# Patient Record
Sex: MALE | Race: WHITE | URBAN - METROPOLITAN AREA
[De-identification: names, ages, dates, MRNs, and addresses within clinical notes are randomized per-mention and may not be internally consistent; named-entity substitution may affect disease eponyms.]

---

## 2020-10-31 ENCOUNTER — HISTORICAL (OUTPATIENT)
Dept: ADMINISTRATIVE | Facility: HOSPITAL | Age: 75
End: 2020-10-31

## 2020-12-15 ENCOUNTER — HISTORICAL (OUTPATIENT)
Dept: ADMINISTRATIVE | Facility: HOSPITAL | Age: 75
End: 2020-12-15

## 2021-03-16 ENCOUNTER — HISTORICAL (OUTPATIENT)
Dept: LAB | Facility: HOSPITAL | Age: 76
End: 2021-03-16

## 2022-04-07 ENCOUNTER — HISTORICAL (OUTPATIENT)
Dept: ADMINISTRATIVE | Facility: HOSPITAL | Age: 77
End: 2022-04-07

## 2022-04-23 VITALS
OXYGEN SATURATION: 97 % | HEIGHT: 68 IN | DIASTOLIC BLOOD PRESSURE: 82 MMHG | SYSTOLIC BLOOD PRESSURE: 146 MMHG | BODY MASS INDEX: 27.74 KG/M2 | WEIGHT: 183 LBS

## 2022-04-30 NOTE — DISCHARGE SUMMARY
Patient:   Rei Garsia            MRN: [id number]            FIN: [id number]               Age:   75 years     Sex:  Male     :  1945   Associated Diagnoses:   None   Author:   Matthew Carrion MD      Basic Information     Prior history/Hospital course  75-year-old male presents to the ER with knee pain and swelling.  Patient says he has a history of bilateral knee pain arthritis, seen by Dr. Cortés about a month ago and was scheduled for a left knee replacement, at that time he had a right knee injection with what sounds like a steroid.  Son is giving most of the history says he developed soreness in that knee about a week after the injection which was fairly mild.  Patient subsequently had his left knee surgery canceled because of elevated glucose and was sent to his PCP Dr. Aguirre who has been adjusting his diabetic medications.  Last week Thursday and Friday patient worked out in the yard fairly vigorously and developed increasing pain and swelling in the knee, [facility] and was given tramadol and knee immobilizer and sent home.  Pain and swelling continued to increase to the point where he was unable to walk or bend his leg.  He presented to the hospital at  orthopedic, ER doctor attempted to aspirate but was unable to obtain any fluid.  Patient is being admitted with septic joint and orthopedic surgeon planning I&D and washout this evening.  Patient underwent arthroscopic knee surgery 10/19 with debridement and washout, IV antibiotics vancomycin.  Patient had persistent positive blood cultures 10/19, 10/20, 10/22 with MRSA. Repeat blood cultures 10/25 negative, PICC line placed for long-term vancomycin ×4 weeks and patient discharged to the [facility] 10/28/2020. His time at [facility] has been uneventful. He was seen by ortho on  for increased knee pain. It improved with a course of mobic. He will complete his course of IV vanc at home on  11/30/20    Subjective  The patient is in his wheelchair, working in the gym. He has no c/o. DC plan d/w patient.      Review of Systems   Except as documented, all other systems reviewed and negative.      Health Status   Allergies:    Allergic Reactions (Selected)  No Known Allergies,    Allergies (1) Active Reaction  No Known Allergies None Documented     Current medications:    Medications (34) Active  Scheduled: (15)  aspirin 81 mg Chew tab  81 mg 1 tab(s), Oral, Daily  atorvastatin 40 mg Tab  40 mg 1 tab(s), Oral, qPM  enoxaparin 40mg/0.4ml Inj  40 mg 0.4 mL, Subcutaneous, Daily  gabapentin 300 mg Cap UD  300 mg 1 cap(s), Oral, At Bedtime  hydrochlorothiazide 25 mg Tab UD  12.5 mg 0.5 tab(s), Oral, qAM  labetalol 200 mg Tab UD  400 mg 2 tab(s), Oral, BID  lisinopril 20 mg Tab UD ([facility])  40 mg 2 tab(s), Oral, Daily  metformin 500 mg Tab UD  500 mg 1 tab(s), Oral, BID  NIFEdipine 30 mg CR  UD  90 mg 3 tab(s), Oral, Daily  pantoprazole 20 mg Oral EC Tab  20 mg 1 tab(s), Oral, Daily  polyethylene glycol (Miralax 17 gm pkt)  17 gm 1 packet(s), Oral, Daily  sodium chloride 0.9% Inj-10ml  10 mL, IV Push, q12hr  tamsulosin 0.4 mg Cap  0.4 mg 1 cap(s), Oral, BID  traMADOL 50 mg Tab UD  50 mg 1 tab(s), Oral, q6hr  vancomycin + vancomycin  1,250 mg, IV Piggyback, q12hr  Continuous: (1)  sodium chloride 0.9% 500 mL  500 mL, IV, 30 mL/hr  PRN: (18)  acetaminophen 325 mg Tab  325 mg 1 tab(s), Oral, q4hr  acetaminophen-oxyCODONE 325 mg-10 mg Tab UD  1 tab(s), Oral, q4hr  benzonatate 100 mg Cap  200 mg 2 cap(s), Oral, q8hr  bisacodyl 10 mg Sup  10 mg 1 supp, OH, Daily  dextrose 50% vial 50 ml  12.5 gm 25 mL, IV Push, Once  dextrose 50% vial 50 ml  12.5 gm 25 mL, IV Push, As Directed  dextrose 50% vial 50 ml  25 gm 50 mL, IV Push, As Directed  docusate sodium 100 mg Cap UD  100 mg 1 cap(s), Oral, BID  glucagon recombinant 1 mg Inj  1 mg 1 EA, IM, q10min  glucagon recombinant 1 mg Inj  1 mg 1 EA, IM, q10min  hydrALAzine 20  mg/ml Inj- 1 mL  20 mg 1 mL, IV, q3hr  insulin (Humalog) lispro 100 u/ml Inj  2-14 units, Subcutaneous, As Directed  labetalol 5 mg/mL 20mL vial  20 mg 4 mL, IV, q4hr  magnesium hydroxide 8% Sunshine (MOM) UD  30 mL, Oral, Daily  methocarbamol 500 mg Tab  750 mg 1.5 tab(s), Oral, q8hr  ondansetron 2 mg/mL inj - 2mL  4 mg 2 mL, IV, q4hr  oxycodone 10 mg ER Tab UD  10 mg 1 tab(s), Oral, q4hr  temazepam 15 mg Cap UD  15 mg 1 cap(s), Oral, At Bedtime        Physical Examination      Vital Signs (last 24 hrs)_____  Last Charted___________  Temp Oral     36.6 DegC  (NOV 23 08:00)  Heart Rate Peripheral   79 bpm  (NOV 23 08:00)  Resp Rate         18 br/min  (NOV 23 08:00)  SBP      135 mmHg  (NOV 23 08:00)  DBP      75 mmHg  (NOV 23 08:00)  SpO2      96 %  (NOV 23 08:00)     General:  Alert and oriented, No acute distress.    HENT:  Normocephalic, Oral mucosa is moist.    Respiratory:  Lungs are clear to auscultation, Respirations are non-labored, Breath sounds are equal, Symmetrical chest wall expansion.    Cardiovascular:  Normal rate, Regular rhythm, PICC line in place.    Musculoskeletal:  Right knee mild swelling, no warmth, reddness or tenderness.    Integumentary:  Warm, Dry, Intact, Pressure related injury   No qualifying data avaialble..    Neurologic:  Alert, Oriented, No focal deficits.    Psychiatric:  Cooperative, Appropriate mood & affect, Normal judgment.       Review / Management   Laboratory Results   Today's Lab Results : PowerNote Discrete Results   11/23/2020 3:40 CST      Vanco Tr                  17.6 ug/ml  HI    11/22/2020 9:00 CST      Blood Glucose Testing Reason              Routine                             Blood Glucose Interventions               Administered agent to decrease blood sugar                             Blood Glucose, POC        200 mg/dL  HI        Documentation reviewed:       Calls & consults placed: dvt prophylaxis.       Impression and Plan   Septic right knee joint/arthroscopic  surgery washout 10/19  MRSA Bacteremia  DM 2 a1c 7.2  Hypertension  History of peptic ulcer disease  BPH  Protein calorie malnutrition  PAB-10  Anemia    Plan  dc home today  Pt completes iv vancomycin on 11/30  f/u with ortho Dr. Cortés  f/u with ID Dr. DILLON  f/u with pcp Dr. Aguirre     DC time >30 mins

## 2022-04-30 NOTE — H&P
Patient:   Rei Garsia            MRN: [id number]            FIN: [id number]               Age:   75 years     Sex:  Male     :  1945   Associated Diagnoses:   None   Author:   Larry Oswald MD      Basic Information   Source of history:  Self.       Chief Complaint      History of Present Illness   Previous history  75-year-old male presents to the ER with knee pain and swelling.  Patient says he has a history of bilateral knee pain arthritis, seen by Dr. Cortés about a month ago and was scheduled for a left knee replacement, at that time he had a right knee injection with what sounds like a steroid.  Son is giving most of the history says he developed soreness in that knee about a week after the injection which was fairly mild.  Patient subsequently had his left knee surgery canceled because of elevated glucose and was sent to his PCP Dr. Saucedo who has been adjusting his diabetic medications.  Last week Thursday and Friday patient worked out in the yard fairly vigorously and developed increasing pain and swelling in the knee, he presented to [facility] Saturday and was given tramadol and knee immobilizer and sent home.  Pain and swelling continued to increase to the point where he was unable to walk or bend his leg.  He presented to the hospital at  orthopedic, ER doctor attempted to aspirate but was unable to obtain any fluid.  Patient is being admitted with septic joint and orthopedic surgeon planning I&D and washout this evening.  Patient denies any fever or chills, he does admit to decreased appetite and decreased energy level and decreasing range of motion to the right knee which is exquisitely tender and swollen.  Patient denies any cardiac history, no chest pain or shortness of breath,  active lifestyle.  No abdominal or urinary complaints.  Patient underwent arthroscopic knee surgery 10/19 with debridement and washout, IV antibiotics vancomycin.  Patient had persistent  positive blood cultures 10/19, 10/20, 10/22.  Repeat blood cultures 10/25 negative, PICC line placed today for long-term vancomycin ×4 weeks and patient is ready for discharge to the LTAC today.  Patient moved upstairs to the LTAC, complaining of knee pain, has not had any pain medicine since this morning.          Review of Systems   Constitutional:  No fever, No chills.    Eye:  No icterus, No blurring.    Ear/Nose/Mouth/Throat:  No decreased hearing, No nasal congestion.    Respiratory:  No shortness of breath, No cough.    Cardiovascular:  No chest pain, No palpitations.    Gastrointestinal:  No nausea, No vomiting.    Genitourinary:  No dysuria, No hematuria.    Hematology/Lymphatics:  No bruising tendency, No bleeding tendency.    Endocrine:  No excessive thirst, No polyuria.    Immunologic:  Not immunocompromised, No recurrent fevers.    Musculoskeletal:  Negative except as documented in history of present illness, No back pain, No neck pain.    Integumentary:  No rash, No pruritus.    Neurologic:  Alert and oriented X4, No abnormal balance.    Psychiatric:  No anxiety, No depression.       Health Status   Allergies:    Allergies (1) Active Reaction  No Known Allergies None Documented     Current medications:  (Selected)   Inpatient Medications  Ordered  Colace 100 mg oral capsule: 100 mg, form: Cap, Oral, BID PRN for constipation, first dose 10/19/20 15:57:00 CDT  Dextrose 50% and Water  (50 mL vial/syringe): 25 mL, 12.5 gm =, form: Injection, IV Push, As Directed PRN for blood glucose, Infuse over: 5 minute(s), first dose 10/19/20 15:48:00 CDT, Unconscious patient: Repeat as ordered per protocol.  Dextrose 50% and Water  (50 mL vial/syringe): 25 mL, 12.5 gm =, form: Injection, IV Push, Once PRN for blood glucose, Infuse over: 5 minute(s), first dose 10/19/20 15:48:00 CDT, Unconscious patient: Look for other source of altered mental status.  Dextrose 50% and Water  (50 mL vial/syringe): 50 mL, 25 gm =, form:  Injection, IV Push, As Directed, Infuse over: 5 minute(s), first dose 10/19/20 15:48:00 CDT, Unconscious patient: Repeat as ordered per protocol.  Dextrose 50% in Water intravenous solution: 25 mL, 12.5 gm =, form: Injection, IV Push, As Directed PRN for blood glucose, Infuse over: 5 minute(s), first dose 10/19/20 15:48:00 CDT, Conscious patient.  Dulcolax Laxative 10 mg RECTAL suppository: 10 mg, form: Supp, RI, Daily PRN for constipation, first dose 10/19/20 18:57:00 CDT, give in unrelieved with MOM and Miralax  Flomax 0.4 mg oral capsule: 0.4 mg, form: Cap, Oral, BID, first dose 10/26/20 21:00:00 CDT  Lipitor: 80 mg, form: Tab, Oral, Daily, first dose 10/19/20 17:00:00 CDT  Lovenox: 40 mg, form: Injection, Subcutaneous, Daily, first dose 10/20/20 12:12:00 CDT  Milk of Magnesia: 30 mL, form: Susp, Oral, Daily PRN for constipation, first dose 10/19/20 18:57:00 CDT  MiraLax (polyethylene glycol 3350): 17 gm, form: Powder-Recon, Oral, Daily, first dose 10/20/20 9:00:00 CDT  NIFEdipine 90 mg oral tablet, extended release: 90 mg, form: Tab-CR, Oral, Daily, first dose 10/20/20 9:00:00 CDT  NS (0.9% Sodium Chloride) Infusion: 1,000 mL, 1,000 mL, IV, Now, 1000 mL/hr, start date 10/19/20 13:04:00 CDT, stop date 10/19/20 13:04:00 CDT, STAT  Neurontin 300 mg oral capsule: 300 mg, form: Cap, Oral, At Bedtime, first dose 10/20/20 21:00:00 CDT  Pepcid 20 mg oral tablet: 20 mg, form: Tab, Oral, Daily, first dose 10/20/20 9:00:00 CDT  Percocet 5/325: 1 tab(s), form: Tab, Oral, q6hr PRN for pain, first dose 10/23/20 14:00:00 CDT, pain score 6-10  Restoril 15 mg oral capsule: 15 mg, form: Cap, Oral, At Bedtime PRN for insomnia, first dose 10/19/20 18:57:00 CDT  Robaxin 750 mg oral tablet: 750 mg, form: Tab, Oral, q8hr PRN for spasm, first dose 10/19/20 18:57:00 CDT  Senokot S 50 mg-8.6 mg oral tablet: 2 tab(s), form: Tab, Oral, BID, first dose 10/19/20 21:00:00 CDT  Sodium Chloride 0.9% PF vial (For PICC Flush): 10 mL, form:  Injection, IV Push, As Directed PRN for other (see comment), first dose 10/28/20 10:10:00 CDT, prior to administering IV meds and/or blood sampling  Sodium Chloride 0.9% PF vial (For PICC Flush): 10 mL, form: Injection, IV Push, q12hr, first dose 10/28/20 11:00:00 CDT, Flush each lumen following hospital policy for other flushing guidelines  Sodium Chloride 0.9% PF vial (For PICC Flush): 20 mL, form: Injection, IV Push, As Directed PRN for other (see comment), first dose 10/28/20 10:10:00 CDT, following administrations of IV medications and/or blood sampling  Tessalon Perles: 200 mg, form: Cap, Oral, q8hr PRN for cough, first dose 10/19/20 15:57:00 CDT  Toradol: 30 mg, form: Injection, IV Push, Once, first dose 01/09/13 8:00:00 CST, stop date 01/09/13 8:00:00 CST, in recovery room  Tradjenta: 5 mg, form: Tab, Oral, Daily, first dose 10/20/20 9:00:00 CDT  Tylenol 325 mg oral tablet: 325 mg, form: Tab, Oral, q4hr PRN for fever, first dose 10/19/20 15:57:00 CDT, > 38°C (100.4°F)  Ultram: 50 mg, form: Tab, Oral, q4hr PRN for as needed for pain, first dose 10/19/20 18:57:00 CDT, Pain Score 1-5  Vasotec 1.25 mg/mL intravenous solution: 2.5 mg, form: Soln, IV, q6hr PRN for hypertension, first dose 10/19/20 15:57:00 CDT, SBP>160  Zofran ODT: 4 mg, form: Tab-Dis, Oral, q6hr PRN for nausea/vomiting, first dose 10/19/20 19:57:00 CDT, once tolerating PO intake  Zofran: 4 mg, form: Injection, IV Push, q6hr PRN for nausea/vomiting, first dose 10/19/20 18:57:00 CDT, If unable to tolerate PO  ascorbic acid: 500 mg, form: Tab, Oral, At Bedtime, first dose 10/19/20 21:00:00 CDT  aspirin 81 mg oral Delayed Release (EC) tablet: 81 mg, form: Tab-EC, Oral, Daily, first dose 10/20/20 9:00:00 CDT  diphenhydrAMINE  IV Push: 25 mg, form: Injection, IV, q4hr PRN for itching, first dose 10/19/20 15:57:00 CDT  glimepiride: 2 mg, form: Tab, Oral, BID, first dose 10/19/20 21:00:00 CDT  glucagon: 1 mg, form: Injection, IM, q10min PRN for blood  glucose, first dose 10/19/20 15:48:00 CDT, Conscious Patient with NO IV access available and BG < 45 mg/dl.  glucagon: 1 mg, form: Injection, IM, q10min PRN for blood glucose, first dose 10/19/20 15:48:00 CDT, Unconscious patient: Patient with NO IV access available and BG < 70 mg/dl.  Place IV access; move to protocol above.  hydrALAZINE (Apresoline) Inj.: 10 mg, form: Injection, IV Push, q4hr PRN for hypertension, first dose 10/19/20 18:57:00 CDT, SBP >160, DBP >90  hydrALAZINE 20 mg/mL injectable solution: 20 mg, form: Injection, IV, q3hr PRN for hypertension, first dose 10/19/20 15:57:00 CDT, SBP>160; choose only if unrelieved by labetalol or choose first if ordered alone for hypertension  hydrochlorothiazide: 12.5 mg, form: Tab, Oral, qAM, first dose 10/21/20 6:00:00 CDT  insulin isophane-insulin regular human recombinant 70 units-30 units/mL subcutaneous injection: 13 units, form: Injection, Subcutaneous, BIDAC, first dose 10/24/20 16:30:00 CDT  insulin lispro: 3-21 units, form: Soln, Subcutaneous, As Directed PRN for blood glucose, first dose 10/19/20 15:48:00 CDT  labetalol 200 mg oral tablet: 400 mg, form: Tab, Oral, BID, first dose 10/26/20 21:00:00 CDT  labetalol 5 mg/mL intravenous solution: 20 mg, form: Soln, IV, q4hr PRN for hypertension, first dose 10/19/20 15:57:00 CDT, SBP>160; choose as initial treatment  lisinopril: 40 mg, form: Tab, Oral, Daily, first dose 10/23/20 9:00:00 CDT  metformin 500 mg oral tablet: 1,000 mg, form: Tab, Oral, BID, first dose 10/20/20 21:00:00 CDT  pantoprazole: 40 mg, form: Tab-EC, Oral, Daily, first dose 10/21/20 6:00:00 CDT  vancomycin + Sodium Chloride 0.9% 250ml 250 mL: 1,250 mg, form: Injection, IV Piggyback, q12hr, Infuse over: 2 hr, first dose 10/23/20 12:00:00 CDT  vancomycin: 500 mg, form: Injection, IV Piggyback, Once, Infuse over: 0.8 hr, first dose 10/19/20 17:00:00 CDT, stop date 10/19/20 17:00:00 CDT  Documented Medications  Documented  Colace 100 mg oral  capsule: 100 mg = 1 cap(s), Oral, BID, PRN PRN constipation, 0 Refill(s)  Dulcolax Laxative 10 mg RECTAL suppository: 10 mg = 1 supp, ID, Daily, PRN PRN constipation, 0 Refill(s)  Flomax 0.4 mg oral capsule: 0.4 mg = 1 cap(s), Oral, BID, 0 Refill(s)  Lovenox: 40 mg = 0.4 mL, Subcutaneous, Daily, 0 Refill(s)  MiraLax: 0 Refill(s)  NIFEdipine 60 mg oral tablet, extended release: 90 mg = 1.5 tab(s), Oral, Daily  Neurontin 300 mg oral capsule: 300 mg = 1 cap(s), Oral, At Bedtime, 0 Refill(s)  Percocet 5/325: 1 tab(s), Oral, q6hr, PRN PRN pain, 0 Refill(s)  Restoril 15 mg oral capsule: 15 mg = 1 cap(s), Oral, At Bedtime, PRN PRN insomnia, 0 Refill(s)  Robaxin 750 mg oral tablet: 750 mg = 1 tab(s), Oral, q8hr, PRN PRN spasm, 0 Refill(s)  Senokot S 50 mg-8.6 mg oral tablet: 2 tab(s), Oral, BID, 0 Refill(s)  Tessalon Perles 100 mg oral capsule: 200 mg = 2 cap(s), Oral, q8hr, PRN PRN cough, 0 Refill(s)  Tylenol 325 mg oral tablet: 325 mg = 1 tab(s), Oral, q4hr, PRN PRN fever, 0 Refill(s)  ascorbic acid 500 mg oral tablet: 500 mg = 1 tab(s), Oral, At Bedtime, 0 Refill(s)  aspirin 81 mg oral tablet, CHEWABLE: 1, Oral, Daily, 0 Refill(s)  atorvastatin 40 mg oral tablet: 40 mg = 1 tab(s), Oral, qPM  glimepiride 2 mg oral tablet: 2 mg = 1 tab(s), Oral, BID  hydrochlorothiazide 12.5 mg oral tablet: 12.5 mg = 1 tab(s), Oral, qAM  insulin isophane-insulin regular human recombinant 70 units-30 units/mL subcutaneous injection: Subcutaneous, BIDAC, 0 Refill(s)  labetalol 200 mg oral tablet: 400 mg = 2 tab(s), Oral, BID, 0 Refill(s)  linagliptin 5 mg oral tablet: 5 mg = 1 tab(s), Oral, Daily, 0 Refill(s)  lisinopril 10 mg oral tablet: 40 mg = 4 tab(s), Oral, Daily, 0 Refill(s)  magnesium hydroxide: 30 mL, Oral, Daily, PRN PRN constipation, 0 Refill(s)  metformin 1000 mg oral tablet: 1,000 mg = 1 tab(s), Oral, BID, 0 Refill(s)  pantoprazole 20 mg ORAL EC-Tablet: 20 mg = 1 tab(s), Oral, Daily  traMADol 50 mg oral tablet: 50 mg = 1 tab(s),  Oral, q6hr  vancomycin: 1,250 mg = 1.25 EA, IV Piggyback, q12hr, 0 Refill(s),    No qualifying data available        Histories   Past Medical History -   Past Social History -   Past Family History -   Past surgical History -    Past Medical History:    Active  Wears glasses ([id number])  Hypertension ([id number])  Comments:  1/6/2013 CST 12:19 Jess Tamayo RN, DR [place] IS HIS DR    10/18/2014 CDT 15:04 Jess Shaffer RN  NO LONGER SEES DR MILES. SEE DR SKYLAR GALO. SAW 2 WEEKS AGO  GASTRIC ULCERS ([id number])  Comments:  1/6/2013 CST 12:20 Jess Tamayo RN  DEVELOPED ABOUT 1 1/2 MONTHS AGO  Arthritis ([id number])  Comments:  1/6/2013 CST 12:21 Jess Tamayo RN  IN KNEES  Cataracts ([id number])  Diabetes ([id number])  Comments:  10/18/2014 CDT 15:07 Jess Shaffer RN  CBG RUNS 121-130    10/18/2014 CDT 15:13 Jess Shaffer RN  PATIENT STATES BORDERLINE. WAS HIGH AT DR GALO'S OFFICE 2 WEEKS AGO  Resolved  Prostate cancer ([id number]):  Resolved.  Comments:  1/6/2013 CST 12:20 Jess Tamayo RN, DR AND DR MORAN    1/9/2013 CST 6:15 Isabella Ramos RN  6-7 years ago   Family History:    History is unknown.   Procedure history:    Arthroscopy Knee (Right) on 10/19/2020 at 75 Years.  Comments:  10/19/2020 18:46 GRECIAT Yaya Beatty RN  auto-populated from documented surgical case  Cataract Extract Phacoemulsification/IOL (Left) on 10/27/2014 at 69 Years.  Comments:  10/27/2014 9:41 Niyah Quiñones RN  auto-populated from documented surgical case  H/O ventral hernia repair (V15.29) on 1/9/2013 at 67 Years.  PALADIUM SEED IMPLANT in 2004 at 59 Years.  Comments:  1/6/2013 12:23 CST - Sae MIRZA , Jess RUBIO  FOR PROSTATE CANCER  ORIF RT ARM WITH PINS AND SCREWS in 1982 at 37 Years.  SUTURES TO HEAD FROM HEAD INJURY.  LEFT THUMB SUTURED.  LEFT KNEE ARTHROSCOPY.  RIGHT KNEE  ARTHROSCOPY.   Social History        Social & Psychosocial Habits    Alcohol  01/06/2013  Use: Current    Frequency: RARELY    Employment/School  01/06/2013  Status: Retired    Highest education: Some college    Home/Environment  01/06/2013  Lives with: Spouse    Living situation: Home/Independent    Nutrition/Health  01/06/2013  Type of diet: Regular    Substance Use  01/06/2013 Risk Assessment: Denies Substance Abuse    Tobacco    Comment: smoked as a teenager only - 01/09/2013 06:17 Isabella Song RN    10/19/2020  Use: Former smoker, quit more    Patient Wants Consult For Cessation Counseling N/A    10/20/2020  Use: Never (less than 100 in l    Patient Wants Consult For Cessation Counseling No    Abuse/Neglect  10/19/2020  SHX Any signs of abuse or neglect No    10/20/2020  SHX Any signs of abuse or neglect No  .        Physical Examination   General:  Alert and oriented, No acute distress.    Eye:  Pupils are equal, round and reactive to light, Extraocular movements are intact.    HENT:  Normocephalic, Tympanic membranes are clear.    Neck:  Supple, Non-tender, No jugular venous distention.    Respiratory:  Lungs are clear to auscultation, Respirations are non-labored.    Cardiovascular:  Normal rate, Regular rhythm.    Gastrointestinal:  Soft, Non-tender.       No qualifying data available   Genitourinary:  No costovertebral angle tenderness, No inguinal tenderness.    Lymphatics:  No lymphadenopathy neck, axilla, groin.    Musculoskeletal:  Normal range of motion, Right knee tenderness.    Integumentary:  Warm, Dry, Pink.    Neurologic:  Oriented, Normal sensory, Normal motor function, Cranial Nerves II-XII are grossly intact.    Cognition and Speech:  Oriented, Speech clear and coherent, Functional cognition intact.    Psychiatric:  Cooperative, Appropriate mood & affect.       Review / Management   Results review:     No qualifying data available.    Laboratory Results   Today's Lab Results : PowerNote  Discrete Results   10/28/2020 16:00 CDT     Blood Glucose, POC        118 mg/dL  HI    10/28/2020 11:21 CDT     POC CBG                   149 mg/dL  HI    10/28/2020 8:50 CDT      Creatinine                0.79 mg/dL                             eGFR-AA                   >60  NA                             eGFR-MATT                  >60  NA     , Reviewed labs   Chest x-ray results   Reviewed radiologist's report   Documentation reviewed   DVT Prophyl -       Impression and Plan   Education and Follow-up:       Counseled: Patient, Regarding diagnosis, Regarding treatment.    Septic right knee joint/arthroscopic surgery washout  MRSA Bacteremia  Hyponatremia/metabolic acidosis resolved  DM 2 uncontrolled a1c 8.8  Hypertension  History of peptic ulcer disease  BPH    Plan    Labs and medication reviewed  Consult PT OT nutritionist case management wound care  Pain managementgive dose now  Labs in the a.m.    GI prophylaxis Protonix  DVT prophylaxis Lovenox